# Patient Record
Sex: FEMALE | Race: WHITE | HISPANIC OR LATINO | Employment: UNEMPLOYED | URBAN - METROPOLITAN AREA
[De-identification: names, ages, dates, MRNs, and addresses within clinical notes are randomized per-mention and may not be internally consistent; named-entity substitution may affect disease eponyms.]

---

## 2024-10-08 ENCOUNTER — OFFICE VISIT (OUTPATIENT)
Dept: OBSTETRICS AND GYNECOLOGY | Facility: CLINIC | Age: 19
End: 2024-10-08
Payer: COMMERCIAL

## 2024-10-08 VITALS
WEIGHT: 124.2 LBS | HEIGHT: 65 IN | DIASTOLIC BLOOD PRESSURE: 78 MMHG | BODY MASS INDEX: 20.69 KG/M2 | HEART RATE: 108 BPM | SYSTOLIC BLOOD PRESSURE: 115 MMHG

## 2024-10-08 DIAGNOSIS — Z30.09 ENCOUNTER FOR COUNSELING REGARDING CONTRACEPTION: Primary | ICD-10-CM

## 2024-10-08 LAB
B-HCG UR QL: NEGATIVE
EXPIRATION DATE: NORMAL
INTERNAL NEGATIVE CONTROL: NEGATIVE
INTERNAL POSITIVE CONTROL: NORMAL
Lab: NORMAL

## 2024-10-08 NOTE — PROGRESS NOTES
"        REASON FOR FOLLOWUP/CHIEF COMPLAINT: t (Patient is here to discuss bc, patient was prev on the depo injection)         HISTORY OF PRESENT ILLNESS: Patient desires long-acting reversible contraceptive.  She moved to the [place] from [place] in May 2024.  She was on Depo-Provera for about a year and has not had a menses for the past 9 months.  Her last Depo-Provera was in May 2024.  She is sexually active and has not been using contraceptive.  She is most interested in the Nexplanon and we discussed the nature of its use and duration of its use and the less predictable aspect of menstrual bleeding after it is placed.  She desires to proceed with this.      Past Medical History, Past Surgical History, Social History, Family History have been reviewed and are without significant changes except as mentioned.    Review of Systems   Review of Systems   Constitutional:  Negative for fatigue and fever.   Genitourinary:  Negative for menstrual problem and pelvic pain.       Medications:  The current medication list was reviewed in the EMR    ALLERGIES:  No Known Allergies           Vitals:    10/08/24 1402   BP: 115/78   Pulse: 108   Weight: 56.3 kg (124 lb 3.2 oz)   Height: 165.1 cm (65\")     Physical Exam    CONSTITUTIONAL:  Vital signs reviewed.  No distress, looks comfortable.  EYES:  Conjunctiva and lids unremarkable.  PERRLA  EARS,NOSE,MOUTH,THROAT:  Ears and nose appear unremarkable.  Lips, teeth, gums appear unremarkable.  RESPIRATORY:  Normal respiratory effort.  Lungs clear to auscultation bilaterally.  CARDIOVASCULAR:  Normal S1, S2.  No murmurs rubs or gallops.  No significant lower extremity edema.  GASTROINTESTINAL: Abdomen appears unremarkable.  Nontender.  No hepatomegaly.  No splenomegaly.  NEURO: cranial nerves 2-12 grossly intact.  No focal deficits.  Appears to have equal strength all 4 extremities.  MUSCULOSKELETAL:  Unremarkable digits/nails.  No cyanosis or clubbing.  SKIN:  Warm.  No " "rashes.  PSYCHIATRIC:  Normal judgment and insight.  Normal mood and affect.       RECENT LABS:No results found for: \"WBC\", \"HGB\", \"PLT\"    ASSESSMENT/PLAN:  Sandra Hankins [unfilled]   1.  Contraception counseling.  Patient desires Nexplanon.  We will do urine pregnancy test today.  We will have her use contraceptive for 3 weeks and she can come back in and repeat the pregnancy test and if negative we can place the Nexplanon at that time.  "

## 2024-10-15 ENCOUNTER — PATIENT MESSAGE (OUTPATIENT)
Dept: OBSTETRICS AND GYNECOLOGY | Facility: CLINIC | Age: 19
End: 2024-10-15
Payer: COMMERCIAL

## 2024-10-15 ENCOUNTER — PATIENT ROUNDING (BHMG ONLY) (OUTPATIENT)
Dept: OBSTETRICS AND GYNECOLOGY | Facility: CLINIC | Age: 19
End: 2024-10-15
Payer: COMMERCIAL

## 2024-10-15 NOTE — PROGRESS NOTES
My chart message has been sent to the patient for PATIENT ROUNDING with [facility].

## 2024-10-22 ENCOUNTER — TELEPHONE (OUTPATIENT)
Dept: OBSTETRICS AND GYNECOLOGY | Facility: CLINIC | Age: 19
End: 2024-10-22

## 2024-10-22 NOTE — TELEPHONE ENCOUNTER
Caller: Sandra Hankins    Relationship: Self    Best call back number: [phone]    What is the best time to reach you: ANY    Who are you requesting to speak with (clinical staff, provider,  specific staff member):        What was the call regarding: PT ACCIDENTALLY CANCELLED APPT VIZ MYCHART; WANTING TO GET APPT BACK FOR 10/29 @11:15. REQUESTING CALL BACK WITH      
[facility] messaged sent to Pt. Pt rescheduled 10/29 @ 11:15 with .  
Lesvia

## 2024-10-29 ENCOUNTER — OFFICE VISIT (OUTPATIENT)
Dept: OBSTETRICS AND GYNECOLOGY | Facility: CLINIC | Age: 19
End: 2024-10-29
Payer: COMMERCIAL

## 2024-10-29 VITALS
WEIGHT: 125.2 LBS | DIASTOLIC BLOOD PRESSURE: 76 MMHG | SYSTOLIC BLOOD PRESSURE: 114 MMHG | BODY MASS INDEX: 20.86 KG/M2 | HEIGHT: 65 IN | HEART RATE: 92 BPM

## 2024-10-29 DIAGNOSIS — Z30.017 NEXPLANON INSERTION: Primary | ICD-10-CM

## 2024-10-29 PROCEDURE — 11981 INSERTION DRUG DLVR IMPLANT: CPT | Performed by: OBSTETRICS & GYNECOLOGY

## 2024-10-29 PROCEDURE — 81025 URINE PREGNANCY TEST: CPT | Performed by: OBSTETRICS & GYNECOLOGY

## 2024-10-29 NOTE — PROGRESS NOTES
Nexplanon Insertion:(Patient is here for insertion of nexplanon.   NDC: [id number]   LOT: [id number]   EXP:10/26/2025)     Pre Dx: 1) Nexplanon Insertion   Post Dx: 1) Nexplanon Insertion     Risks, benefits, alternatives explained. All questions answered. Consents signed.  Patient placed on examined table in supine position with her nondominant left arm flexed at the elbow and hand resting under her head. Nexplanon to be inserted into nondominant arm. The area for insertion prepped with betadine in a sterile fashion. About 3 mL of 1% lidocaine.     The insertion site was identified approximately 8-10 cm proximal to the humoral epicondyle and 3-4 cm below with sulcus of the triceps and biceps muscle. The skin at the insertion site was stretched by my thumb and index finger. Then inserted the needle tip, bevel side up, at an angle not greater than 30° to the skin surface, just until the skin was penetrated to below the bevel. The applicator was then lowered so that it was parallel to the arm. To minimize the chance of deep incision, the skin was tented upwards with the tip of the needle. The needle was then gently inserted to the full length. Then fixed the device in place on the arm with one hand. I then slowly and carefully retracted the needle back by retracting the release lever. The obturator was seen in the device to determine that the nexplanon had been released.     Both the patient and I were easily able to feel the device under the skin. Steri-Strips were applied at the site, and the arm was gently wrapped with gauze.    There were no complications.   The patient tolerated the procedure very well.    She was given a reminder card. She was advised to use condoms as a backup method for at least a week after insertion.    Expectations, warning signs and limitations reviewed.     Pk Pizarro MD  10/29/2024  11:25 EDT

## 2024-11-05 ENCOUNTER — OFFICE VISIT (OUTPATIENT)
Dept: OBSTETRICS AND GYNECOLOGY | Facility: CLINIC | Age: 19
End: 2024-11-05
Payer: COMMERCIAL

## 2024-11-05 VITALS
SYSTOLIC BLOOD PRESSURE: 121 MMHG | BODY MASS INDEX: 20.96 KG/M2 | HEART RATE: 97 BPM | WEIGHT: 125.8 LBS | DIASTOLIC BLOOD PRESSURE: 77 MMHG | HEIGHT: 65 IN

## 2024-11-05 DIAGNOSIS — Z01.419 ENCOUNTER FOR GYNECOLOGICAL EXAMINATION WITHOUT ABNORMAL FINDING: Primary | ICD-10-CM

## 2024-11-05 NOTE — PROGRESS NOTES
"GYN Annual Exam     CC- Here for annual exam.      Sandra Hankins is a 19 y.o. female who presents for annual well woman exam. Periods are  no recent menses because she was on Depo-Provera and now is on Nexplanon. , lasting 0 days. Dysmenorrhea:none. Cyclic symptoms include none. No intermenstrual bleeding, spotting, or discharge.    OB History          1    Para        Term                AB        Living   1         SAB        IAB        Ectopic        Molar        Multiple        Live Births   1                Current contraception: Nexplanon  History of abnormal Pap smear: no  Family history of uterine, colon or ovarian cancer: no  History of abnormal mammogram: no  Family history of breast cancer: no  Last Pap :     History reviewed. No pertinent past medical history.    History reviewed. No pertinent surgical history.    No current outpatient medications on file.    No Known Allergies    Social History     Tobacco Use    Smoking status: Never     Passive exposure: Never    Smokeless tobacco: Never   Substance Use Topics    Alcohol use: Never    Drug use: Never       History reviewed. No pertinent family history.    Review of Systems   Constitutional:  Negative for fatigue and fever.   Genitourinary:  Negative for menstrual problem, pelvic pain and urinary incontinence.       /77   Pulse 97   Ht 165.1 cm (65\")   Wt 57.1 kg (125 lb 12.8 oz)   BMI 20.93 kg/m²     Physical Exam  Constitutional:       Appearance: She is normal weight.   Genitourinary:      Bladder and urethral meatus normal.      No lesions in the vagina.      Right Labia: No lesions.     Left Labia: No lesions.     No vaginal discharge, tenderness or bleeding.      No vaginal prolapse present.     No vaginal atrophy present.       Right Adnexa: not tender, not full and no mass present.     Left Adnexa: not tender, not full and no mass present.     No cervical motion tenderness, discharge, friability or lesion.      " Uterus is not enlarged, fixed or tender.      No uterine mass detected.     Uterus is midaxial.   Abdominal:      General: Abdomen is flat.      Palpations: Abdomen is soft. There is no mass.      Tenderness: There is no abdominal tenderness.   Neurological:      Mental Status: She is alert.   Vitals reviewed.               Assessment     1) GYN annual well woman exam.   2) normal annual exam.  Pap with GC and chlamydia performed.     Plan     1) Breast Health - Clinical breast exam yearly, Discussed [employer] cancer society recommendations for breast cancer screening, and Self breast awareness monthly  2) Pap -done  3) Smoking status-negative  4) Encouraged to be wary of information obtained via social media and internet based on source and search.  5) Follow up prn and one year.       Pk Pizarro MD   11/5/2024  14:05 EST

## 2024-11-12 LAB
C TRACH RRNA CVX QL NAA+PROBE: NEGATIVE
CYTOLOGIST CVX/VAG CYTO: NORMAL
CYTOLOGY CVX/VAG DOC CYTO: NORMAL
CYTOLOGY CVX/VAG DOC THIN PREP: NORMAL
DX ICD CODE: NORMAL
HPV GENOTYPE REFLEX: NORMAL
HPV I/H RISK 4 DNA CVX QL PROBE+SIG AMP: NEGATIVE
Lab: NORMAL
N GONORRHOEA RRNA CVX QL NAA+PROBE: NEGATIVE
OTHER STN SPEC: NORMAL
STAT OF ADQ CVX/VAG CYTO-IMP: NORMAL